# Patient Record
(demographics unavailable — no encounter records)

---

## 2025-03-20 NOTE — REASON FOR VISIT
[Post Urgent Care] : a post urgent care visit [Patient] : patient [Mother] : mother [FreeTextEntry1] : right ankle injury 3/13/25

## 2025-03-20 NOTE — PHYSICAL EXAM
[FreeTextEntry1] : Healthy appearing 14 year-old child. Awake, alert, in no acute distress. Pleasant and cooperative.  Eyes are clear with no sclera abnormalities. External ears, nose and mouth are clear.  Good respiratory effort with no audible wheezing without use of a stethoscope. Ambulates independently with no evidence of antalgia. Good coordination and balance. Able to get on and off exam table without difficulty.   Focused examination of the right ankle: Skin is clean, dry and intact. There is no erythema, swelling or ecchymosis. He is tender to the distal fibula, roughly over the level of the physis. There is no pain or instability with passive eversion of the foot.  With inversion, he does have some mild pain laterally. He is able to walk, jog, jump and hop without difficulty or reported pain He can elevate to his toes without difficulty Good subtalar motion is appreciated. Heels reconstitute into varus when on toes. Sensation is intact to light touch distally. 5/5 strength in EHL/FHL/TA/GS DP 2+, brisk capillary refill less than 2 seconds in all digits

## 2025-03-20 NOTE — ASSESSMENT
[FreeTextEntry1] : 14yM with right ankle injury 3/13/25 - sprain vs SERA occult fracture with incidental finding of distal fibula NOF.  The history was obtained today from the child and parent; given the patient's age and/or the child's mental capacity, the history was unreliable and the parent was used as an independent historian.   Farshad is overall doing well. His symptoms are much improved from time of injury and he is able to walk in his regular shoe wear. He has some mild residual tenderness over the distal fibula, which could represent an occult SERA injury (vs sprain). I recommend he remain out of gym and sports for 1 additional week. Given his comfort, no immobilization is needed. We will see him back in 1 week to repeat right ankle XR and evaluate for possible healing response. Of note, he will need an XR 6 months from now as well to eval any interval changes of his distal fibula NOF. This plan was discussed with family and all questions and concerns were addressed today.  Next visit: R ankle XR   I, Fabiana Troy PA-C, have acted as a scribe and documented the above for Dr. Briana Scruggs

## 2025-03-20 NOTE — DATA REVIEWED
[de-identified] : [facility] - Right ankle x-ray 3 views demonstrating no acute fracture or dislocation. There is a small benign appearing lesion to the distal fibula, likely NOF vs bone cyst, that is incidentally noted.

## 2025-03-20 NOTE — DATA REVIEWED
[de-identified] : [facility] - Right ankle x-ray 3 views demonstrating no acute fracture or dislocation. There is a small benign appearing lesion to the distal fibula, likely NOF vs bone cyst, that is incidentally noted.

## 2025-03-20 NOTE — END OF VISIT
[FreeTextEntry3] : A physician assistant/resident assisted with documenting the visit and acted as a scribe. I have seen and examined the patient, made my assessment and plan and have made all modifications necessary to the note.  Briana Scruggs MD Pediatric Orthopaedics Surgery [facility]

## 2025-03-20 NOTE — HISTORY OF PRESENT ILLNESS
[FreeTextEntry1] : Farshad is a 14-year-old male who presents today accompanied by his mother for evaluation of his right ankle following rolling injury 3/13/25. The child reports he was in gym playing hockey when he went for a ball and twisted his ankle. He went to the nurse and she noticed he had swelling. They called his mother and he went to a [facility] where XR were taken. No fracture was noted, but an incidental findings of a benign appearing lesion to the distal fibula was noted. He was given an aircast which he stopped 2 days ago. He was told to follow up with Peds Ortho. He is here today and states he feels much better. He is able to walk with his regular shoe wear. He does not need any pain medication. He is hoping for clearance for activity today because he has Lacrosse try outs tonight and this week. Here for further care.

## 2025-03-20 NOTE — HISTORY OF PRESENT ILLNESS
[FreeTextEntry1] : Farshad is a 14-year-old male who presents today accompanied by his mother for evaluation of his right ankle following rolling injury 3/13/25. The child reports he was in gym playing hockey when he went for a ball and twisted his ankle. He went to the nurse and she noticed he had swelling. They called his mother and he went to a [facility] where XR were taken. No fracture was noted, but an incidental findings of a benign appearing lesion to the distal fibula was noted. He was given an aircast which he stopped 2 days ago. He was told to follow up with Peds Ortho. He is here today and states he feels much better. He is able to walk with his regular shoe wear. He does not need any pain medication. He is hoping for clearance for activity today because he has Lacrosse try outs tonight and this week. Here for further care.

## 2025-03-20 NOTE — END OF VISIT
[FreeTextEntry3] : A physician assistant/resident assisted with documenting the visit and acted as a scribe. I have seen and examined the patient, made my assessment and plan and have made all modifications necessary to the note.  Briana Scruggs MD Pediatric Orthopaedics Surgery [facility]

## 2025-03-26 NOTE — REASON FOR VISIT
[Follow Up] : a follow up visit [Patient] : patient [Mother] : mother [FreeTextEntry1] : right ankle injury 3/13/25

## 2025-03-26 NOTE — END OF VISIT
[FreeTextEntry3] : A physician assistant/resident assisted with documenting the visit and acted as a scribe. I have seen and examined the patient, made my assessment and plan and have made all modifications necessary to the note.  Briana Scruggs MD Pediatric Orthopaedics Surgery [facility]

## 2025-03-26 NOTE — PHYSICAL EXAM
[FreeTextEntry1] : Healthy appearing 14 year-old child. Awake, alert, in no acute distress. Pleasant and cooperative.  Eyes are clear with no sclera abnormalities. External ears, nose and mouth are clear.  Good respiratory effort with no audible wheezing without use of a stethoscope. Ambulates independently with no evidence of antalgia. Good coordination and balance. Able to get on and off exam table without difficulty.   Focused examination of the right ankle: Skin is clean, dry and intact. There is no erythema, swelling or ecchymosis. He is not tender to the distal fibula today as was present last week.  There is no pain or instability with passive eversion of the foot.  No pain elicited with inversion. He is able to walk, jog, jump and hop without difficulty or reported pain He can elevate to his toes without difficulty Good subtalar motion is appreciated. Heels reconstitute into varus when on toes. Sensation is intact to light touch distally. 5/5 strength in EHL/FHL/TA/GS DP 2+, brisk capillary refill less than 2 seconds in all digits

## 2025-03-26 NOTE — DATA REVIEWED
[de-identified] : 3 views of the right ankle today in the office reveal no evidence of healing response. Mortise intact. There is a small benign appearing lesion to the distal fibula which is likely NOF vs bone cyst that is incidental.  [facility] - Right ankle x-ray 3 views demonstrating no acute fracture or dislocation. There is a small benign appearing lesion to the distal fibula, likely NOF vs bone cyst, that is incidentally noted.

## 2025-03-26 NOTE — HISTORY OF PRESENT ILLNESS
[0] : currently ~his/her~ pain is 0 out of 10 [FreeTextEntry1] : Farshad is a 14-year-old male who presents today accompanied by his mother for f/u of his right ankle following rolling injury 3/13/25. The child reports he was in gym playing hockey when he went for a ball and twisted his ankle. He went to the nurse and she noticed he had swelling. They called his mother and he went to a [facility] where XR were taken. No fracture was noted, but an incidental findings of a benign appearing lesion to the distal fibula was noted. He was given an aircast which he stopped prior to the last visit. He was told to follow up with Peds Ortho. He is here today and states he feels much better. NO pain reported. He is able to walk with his regular shoe wear. He does not need any pain medication. He is here for f/u and xrays.

## 2025-03-26 NOTE — ASSESSMENT
[FreeTextEntry1] : 14yM with right ankle injury 3/13/25 - sprain resolved with incidental finding of distal fibula NOF.  The history was obtained today from the child and parent; given the patient's age and/or the child's mental capacity, the history was unreliable and the parent was used as an independent historian.  3 views of the right ankle today in the office reveal no evidence of healing response. Mortise intact. There is a small benign appearing lesion to the distal fibula which is likely NOF vs bone cyst that is incidental.  Farshad is overall doing well. He most likely sustained a minor sprain which has resolved. He can return to activity as tolerated. He will f/u in 6 months for repeat xrays of the right ankle to  monitor the benign lesion.  All questions answered. Parent in agreement with the plan. Tiara ADAME, MPAS, PAC, have acted as a scribe and documented the above for Dr. Scruggs.